# Patient Record
Sex: FEMALE | Race: WHITE
[De-identification: names, ages, dates, MRNs, and addresses within clinical notes are randomized per-mention and may not be internally consistent; named-entity substitution may affect disease eponyms.]

---

## 2023-10-24 ENCOUNTER — HOSPITAL ENCOUNTER (EMERGENCY)
Dept: HOSPITAL 76 - ED | Age: 28
Discharge: HOME | End: 2023-10-24
Payer: COMMERCIAL

## 2023-10-24 VITALS — DIASTOLIC BLOOD PRESSURE: 64 MMHG | OXYGEN SATURATION: 98 % | SYSTOLIC BLOOD PRESSURE: 107 MMHG

## 2023-10-24 DIAGNOSIS — O03.9: Primary | ICD-10-CM

## 2023-10-24 LAB
B-HCG SERPL QL: NEGATIVE
CLARITY UR REFRACT.AUTO: CLEAR
GLUCOSE UR QL STRIP.AUTO: NEGATIVE MG/DL
KETONES UR QL STRIP.AUTO: NEGATIVE MG/DL
NITRITE UR QL STRIP.AUTO: NEGATIVE
PH UR STRIP.AUTO: 7 PH (ref 5–7.5)
PROT UR STRIP.AUTO-MCNC: NEGATIVE MG/DL
RBC # UR STRIP.AUTO: (no result) /UL
RBC # URNS HPF: (no result) /HPF (ref 0–5)
SP GR UR STRIP.AUTO: 1.01 (ref 1–1.03)
SQUAMOUS URNS QL MICRO: (no result)
UROBILINOGEN UR QL STRIP.AUTO: (no result) E.U./DL
UROBILINOGEN UR STRIP.AUTO-MCNC: NEGATIVE MG/DL
WBC # UR MANUAL: (no result) /HPF (ref 0–5)

## 2023-10-24 PROCEDURE — 87086 URINE CULTURE/COLONY COUNT: CPT

## 2023-10-24 PROCEDURE — 36415 COLL VENOUS BLD VENIPUNCTURE: CPT

## 2023-10-24 PROCEDURE — 99283 EMERGENCY DEPT VISIT LOW MDM: CPT

## 2023-10-24 PROCEDURE — 81003 URINALYSIS AUTO W/O SCOPE: CPT

## 2023-10-24 PROCEDURE — 81001 URINALYSIS AUTO W/SCOPE: CPT

## 2023-10-24 PROCEDURE — 84703 CHORIONIC GONADOTROPIN ASSAY: CPT

## 2023-10-24 PROCEDURE — 99284 EMERGENCY DEPT VISIT MOD MDM: CPT

## 2023-10-24 NOTE — ULTRASOUND REPORT
PROCEDURE:  Pelvic w/Transvaginal

 

INDICATIONS:  pelvic pain, R

 

TECHNIQUE:  

Real-time scanning was performed of the pelvic organs, with image documentation.  Additional endovagi
nal scanning was necessary due to incomplete visualization of the adnexal and endometrial structures 
by transabdominal scanning.  

 

COMPARISON:  None.

 

FINDINGS:  

 

Uterus:  Uterus is anteverted and normal in size at 6 x 4.2 x 3.1 cm.  The myometrium is homogeneous.
  The endometrium measures 5 mm in combined thickness.  No fibroids.

 

Ovaries:  The right ovary measures 3.6 x 2.1 x 1.8 cm, with a calculated ovarian volume of 6 cc.  The
 left ovary measures 2.3 x 1.2 x 0.9 cm, with a calculated ovarian volume of 2 cc.  The ovaries have 
a normal sonographic appearance.  Greater than 12 follicles can be seen in each ovary.  No adnexal ma
sses are seen. No cystic lesions measuring greater than 3 cm.

 

Other:  No pathologic free abdominal or pelvic fluid.

 

 

IMPRESSION:  

1. No mass or significant ovarian cysts.

 

2. Greater than 12 ovarian follicles bilaterally. This finding could be seen in the setting of polycy
stic ovarian syndrome.

 

3. No endometrial thickening. 

 

 

 

Reviewed by: Jose Alejandro Tellez MD on 10/24/2023 3:54 PM PDT

Approved by: Jose Alejandro Tellez MD on 10/24/2023 3:54 PM PDT

 

 

Station ID:  [id number]

## 2023-10-24 NOTE — ED PHYSICIAN DOCUMENTATION
PD HPI FEMALE 





- Stated complaint


Stated Complaint: /SPOTTING





- Chief complaint


Chief Complaint: Abd Pain





- History obtained from


History obtained from: Patient





- Additional information


Additional information: 





This is a 28-year-old female G1, P0 who presents with vaginal bleeding.  The 

patient believes she is about 4-5 weeks pregnant, LMP 9/18/2023. she states that

she missed her period and she had a small amount of spotting which she thought 

was implantation bleeding and she took a pregnancy test at that time which was 

positive.  She then had no further bleeding up until 2 days ago when she had 

some increased spotting which has since turned into heavier bleeding with some 

passage of tissue like substance.  Patient states she is using a pad or a tampon

but not having to change regularly.  She has not bled heavily but it is similar 

to her menses.  She has had some mild lower abdominal cramping with that though 

the cramping has resolved today.  She has not had any dysuria urgency or fr

equency, no fever or chills, no abdominal distention.  She has not felt dizzy or

weak.  She was concerned that she may be having a miscarriage.





PD PAST MEDICAL HISTORY





- Past Medical History


Past Medical History: No


Cardiovascular: None


Respiratory: None


Neuro: None


Endocrine/Autoimmune: None


GI: None


GYN: None


: None


HEENT: None


Psych: None


Musculoskeletal: None


Derm: None





- Past Surgical History


Past Surgical History: No





- Present Medications


Home Medications: 


                                Ambulatory Orders











 Medication  Instructions  Recorded  Confirmed


 


No Known Home Medications  10/24/23 10/24/23














- Allergies


Allergies/Adverse Reactions: 


                                    Allergies











Allergy/AdvReac Type Severity Reaction Status Date / Time


 


No Known Drug Allergies Allergy   Verified 10/24/23 13:00














- Social History


Does the pt smoke?: No


Smoking Status: Never smoker


Does the pt drink ETOH?: Yes


Does the pt have substance abuse?: No





- Immunizations


Immunizations are current?: Yes





PD ED PE NORMAL





- Vitals


Vital signs reviewed: Yes





- General


General: Alert and oriented X 3, No acute distress, Well developed/nourished





- Abdomen


Abdomen: Normal bowel sounds, Soft, Non tender, Non distended





- Back


Back: No CVA TTP





- Derm


Derm: Normal color, Warm and dry





Results





- Vitals


Vitals: 


                               Vital Signs - 24 hr











  10/24/23 10/24/23





  13:01 15:27


 


Temperature 37.4 C 36.6 C


 


Heart Rate 84 73


 


Respiratory 16 18





Rate  


 


Blood Pressure 128/77 107/64


 


O2 Saturation 95 98








                                     Oxygen











O2 Source                      Room air

















- Labs


Labs: 


                                Laboratory Tests











  10/24/23 10/24/23





  13:25 13:28


 


Serum HCG, Qual  NEGATIVE 


 


Urine Color   YELLOW


 


Urine Clarity   CLEAR


 


Urine pH   7.0


 


Ur Specific Gravity   1.010


 


Urine Protein   NEGATIVE


 


Urine Glucose (UA)   NEGATIVE


 


Urine Ketones   NEGATIVE


 


Urine Occult Blood   MODERATE H


 


Urine Nitrite   NEGATIVE


 


Urine Bilirubin   NEGATIVE


 


Urine Urobilinogen   0.2 (NORMAL)


 


Ur Leukocyte Esterase   NEGATIVE


 


Urine RBC   0-5


 


Urine WBC   0-3


 


Ur Squamous Epith Cells   FEW Squamous


 


Amorphous Sediment   Few


 


Urine Bacteria   Few


 


Ur Microscopic Review   INDICATED


 


Urine Culture Comments   NOT INDICATED














- Rads (name of study)


  ** No standard instances


Relevant Findings:: Final report received





PD Medical Decision Making





- ED course


Complexity details: reviewed results, considered differential, d/w patient, d/w 

family


ED course: 





28-year-old female presented with vaginal bleeding.  The patient had a positive 

home pregnancy test and was concerned about a miscarriage.Differentials 

considered included miscarriage, late menses, ectopic pregnancy.  She is well 

appearing and hemodynamically stable here.  Pregnancy test here was negative, 

urinalysis negative for signs of infection.  I did proceed with a pelvic 

ultrasound to evaluate for possible retained products or any signs of pregnancy 

and this was negative.  The patient may have had a completed miscarriage or 

potentially had a late menses.  I discussed these findings with the patient and 

advised that it did not appear that she was pregnant at this time and possibly 

had a completed miscarriage.  I recommended that she follow-up with her OB As 

needed and did anticipate the bleeding to improve in the next couple of days but

if she had any heavy bleeding such as greater than 2 pads per hour for 2 

consecutive hours or other new concerns to return to the ER.





Departure





- Departure


Disposition: 01 Home, Self Care


Clinical Impression: 


 Complete miscarriage





Condition: Good


Instructions:  ED Miscarriage Completed


Comments: 


Your labs and ultrasound do not show signs of active pregnancy. You likely 

completed a miscarriage or potentially had a false positive test previously. 

Please continue follow up with your primary doctor or OB in the next week or 

two. 


Forms:  PCP List


Discharge Date/Time: 10/24/23 15:31